# Patient Record
Sex: FEMALE | Race: WHITE | URBAN - METROPOLITAN AREA
[De-identification: names, ages, dates, MRNs, and addresses within clinical notes are randomized per-mention and may not be internally consistent; named-entity substitution may affect disease eponyms.]

---

## 2017-02-09 ENCOUNTER — TRANSFERRED RECORDS (OUTPATIENT)
Dept: HEALTH INFORMATION MANAGEMENT | Facility: CLINIC | Age: 37
End: 2017-02-09

## 2017-02-09 ENCOUNTER — HOSPITAL ENCOUNTER (OUTPATIENT)
Facility: CLINIC | Age: 37
End: 2017-02-09
Payer: MEDICAID

## 2017-02-16 RX ORDER — LIDOCAINE 40 MG/G
CREAM TOPICAL
Status: CANCELLED | OUTPATIENT
Start: 2017-02-16

## 2017-02-16 RX ORDER — SODIUM CHLORIDE 9 MG/ML
INJECTION, SOLUTION INTRAVENOUS CONTINUOUS
Status: CANCELLED | OUTPATIENT
Start: 2017-02-16

## 2017-02-17 ENCOUNTER — TELEPHONE (OUTPATIENT)
Dept: INTERVENTIONAL RADIOLOGY/VASCULAR | Facility: CLINIC | Age: 37
End: 2017-02-17

## 2017-02-20 ENCOUNTER — APPOINTMENT (OUTPATIENT)
Dept: MEDSURG UNIT | Facility: CLINIC | Age: 37
End: 2017-02-20
Payer: MEDICAID

## 2017-04-07 ENCOUNTER — TELEPHONE (OUTPATIENT)
Dept: INTERVENTIONAL RADIOLOGY/VASCULAR | Facility: CLINIC | Age: 37
End: 2017-04-07

## 2017-04-07 ENCOUNTER — TRANSFERRED RECORDS (OUTPATIENT)
Dept: HEALTH INFORMATION MANAGEMENT | Facility: CLINIC | Age: 37
End: 2017-04-07

## 2017-04-07 ENCOUNTER — HOSPITAL ENCOUNTER (OUTPATIENT)
Facility: CLINIC | Age: 37
End: 2017-04-07
Payer: MEDICAID

## 2017-04-07 NOTE — PROGRESS NOTES
"Called [facility] @ [phone] and asked them to amend their Lumbar MRI order to be done with General Anesthesia rather than IV Conscious Sedation due to Dr. Ventura Sandoval's comments in his Feb 9, 2017 encounter faxed to us 2/17/17:    \"I think her episodes of hypoxia may be related to her reliance on sedating medications for her chronic pain and spasms\" and \"I wonder if she isn't aspirating - she sleeps in a recliner w/her head/torso elevated generally 30 degrees.\"   She requires a large MRI, weight is 425 pounds. (Charted weights in same note up to 438)    Because of the above statements IR RN will not be able to safely sedate patient lying flat in MRI #4 and the MRI will need to be done with general anesthesia.    I gave the [facility] RN for Dr. Sandoval the Procedure Scheduling Fax #: [phone]    MRI can still accomodate the pt Wed 4/12/17 at 1230, 1330 or 1530 if anesthesia team is available; MRI tech Narendra moved her to the correct room and saved the 1230 spot for now.  "

## 2017-04-11 ENCOUNTER — HOSPITAL ENCOUNTER (OUTPATIENT)
Facility: CLINIC | Age: 37
End: 2017-04-11
Payer: MEDICAID

## 2017-04-12 ENCOUNTER — ANESTHESIA (OUTPATIENT)
Dept: SURGERY | Facility: CLINIC | Age: 37
End: 2017-04-12
Payer: MEDICAID

## 2017-04-12 ENCOUNTER — ANESTHESIA EVENT (OUTPATIENT)
Dept: SURGERY | Facility: CLINIC | Age: 37
End: 2017-04-12
Payer: MEDICAID

## 2017-04-12 DIAGNOSIS — M54.16 RIGHT LUMBAR RADICULOPATHY: Primary | ICD-10-CM

## 2017-04-21 RX ORDER — PSEUDOEPHEDRINE HCL 30 MG
TABLET ORAL EVERY 6 HOURS PRN
COMMUNITY

## 2017-04-21 RX ORDER — AMOXICILLIN 250 MG
1 CAPSULE ORAL DAILY PRN
COMMUNITY

## 2017-04-21 RX ORDER — CETIRIZINE HYDROCHLORIDE 10 MG/1
10 TABLET ORAL DAILY PRN
COMMUNITY

## 2017-04-21 RX ORDER — ONDANSETRON 4 MG/1
4 TABLET, ORALLY DISINTEGRATING ORAL EVERY 8 HOURS PRN
COMMUNITY

## 2017-04-21 RX ORDER — MUPIROCIN 20 MG/G
OINTMENT TOPICAL 2 TIMES DAILY
COMMUNITY

## 2017-04-21 RX ORDER — BUTALBITAL, ACETAMINOPHEN AND CAFFEINE 50; 325; 40 MG/1; MG/1; MG/1
1 TABLET ORAL EVERY 6 HOURS PRN
COMMUNITY

## 2017-04-21 RX ORDER — MULTIPLE VITAMINS W/ MINERALS TAB 9MG-400MCG
1 TAB ORAL DAILY
COMMUNITY

## 2017-04-21 RX ORDER — TRIAMCINOLONE ACETONIDE 0.25 MG/G
OINTMENT TOPICAL 2 TIMES DAILY
COMMUNITY

## 2017-04-25 ENCOUNTER — HOSPITAL ENCOUNTER (OUTPATIENT)
Facility: CLINIC | Age: 37
Discharge: HOME | End: 2017-04-25
Attending: ANESTHESIOLOGY | Admitting: ANESTHESIOLOGY
Payer: MEDICAID

## 2017-04-25 ENCOUNTER — SURGERY (OUTPATIENT)
Age: 37
End: 2017-04-25

## 2017-04-25 ENCOUNTER — HOSPITAL ENCOUNTER (OUTPATIENT)
Dept: MRI IMAGING | Facility: CLINIC | Age: 37
End: 2017-04-25
Attending: INTERNAL MEDICINE | Admitting: ANESTHESIOLOGY
Payer: MEDICAID

## 2017-04-25 VITALS
RESPIRATION RATE: 17 BRPM | OXYGEN SATURATION: 97 % | WEIGHT: 293 LBS | DIASTOLIC BLOOD PRESSURE: 75 MMHG | BODY MASS INDEX: 43.4 KG/M2 | TEMPERATURE: 98.2 F | HEIGHT: 69 IN | SYSTOLIC BLOOD PRESSURE: 141 MMHG

## 2017-04-25 DIAGNOSIS — G89.29 CHRONIC RIGHT-SIDED LOW BACK PAIN, WITH SCIATICA PRESENCE UNSPECIFIED: ICD-10-CM

## 2017-04-25 DIAGNOSIS — M54.5 CHRONIC RIGHT-SIDED LOW BACK PAIN, WITH SCIATICA PRESENCE UNSPECIFIED: ICD-10-CM

## 2017-04-25 PROCEDURE — C9399 UNCLASSIFIED DRUGS OR BIOLOG: HCPCS

## 2017-04-25 PROCEDURE — 71000017 ZZH RECOVERY PHASE 1 LEVEL 3 EA ADDTL HR

## 2017-04-25 PROCEDURE — 71000016 ZZH RECOVERY PHASE 1 LEVEL 3 FIRST HR

## 2017-04-25 PROCEDURE — 25000132 ZZH RX MED GY IP 250 OP 250 PS 637: Performed by: ANESTHESIOLOGY

## 2017-04-25 PROCEDURE — 71000027 ZZH RECOVERY PHASE 2 EACH 15 MINS

## 2017-04-25 PROCEDURE — 40000170 ZZH STATISTIC PRE-PROCEDURE ASSESSMENT II

## 2017-04-25 PROCEDURE — 25000125 ZZHC RX 250

## 2017-04-25 PROCEDURE — 25800025 ZZH RX 258: Performed by: ANESTHESIOLOGY

## 2017-04-25 PROCEDURE — 25000128 H RX IP 250 OP 636

## 2017-04-25 PROCEDURE — 72148 MRI LUMBAR SPINE W/O DYE: CPT

## 2017-04-25 PROCEDURE — 37000009 ZZH ANESTHESIA TECHNICAL FEE, EACH ADDTL 15 MIN

## 2017-04-25 PROCEDURE — 25000128 H RX IP 250 OP 636: Performed by: ANESTHESIOLOGY

## 2017-04-25 PROCEDURE — 25000125 ZZHC RX 250: Performed by: ANESTHESIOLOGY

## 2017-04-25 PROCEDURE — 37000008 ZZH ANESTHESIA TECHNICAL FEE, 1ST 30 MIN

## 2017-04-25 RX ORDER — FENTANYL CITRATE 50 UG/ML
25-50 INJECTION, SOLUTION INTRAMUSCULAR; INTRAVENOUS
Status: DISCONTINUED | OUTPATIENT
Start: 2017-04-25 | End: 2017-04-25 | Stop reason: HOSPADM

## 2017-04-25 RX ORDER — LIDOCAINE 40 MG/G
CREAM TOPICAL
Status: DISCONTINUED | OUTPATIENT
Start: 2017-04-25 | End: 2017-04-25 | Stop reason: HOSPADM

## 2017-04-25 RX ORDER — OXYCODONE HYDROCHLORIDE 5 MG/1
15 TABLET ORAL ONCE
Status: COMPLETED | OUTPATIENT
Start: 2017-04-25 | End: 2017-04-25

## 2017-04-25 RX ORDER — ONDANSETRON 4 MG/1
4 TABLET, ORALLY DISINTEGRATING ORAL EVERY 30 MIN PRN
Status: DISCONTINUED | OUTPATIENT
Start: 2017-04-25 | End: 2017-04-25 | Stop reason: HOSPADM

## 2017-04-25 RX ORDER — MEPERIDINE HYDROCHLORIDE 25 MG/ML
12.5 INJECTION INTRAMUSCULAR; INTRAVENOUS; SUBCUTANEOUS
Status: DISCONTINUED | OUTPATIENT
Start: 2017-04-25 | End: 2017-04-25 | Stop reason: HOSPADM

## 2017-04-25 RX ORDER — SODIUM CHLORIDE, SODIUM LACTATE, POTASSIUM CHLORIDE, CALCIUM CHLORIDE 600; 310; 30; 20 MG/100ML; MG/100ML; MG/100ML; MG/100ML
INJECTION, SOLUTION INTRAVENOUS CONTINUOUS
Status: DISCONTINUED | OUTPATIENT
Start: 2017-04-25 | End: 2017-04-25 | Stop reason: HOSPADM

## 2017-04-25 RX ORDER — NALOXONE HYDROCHLORIDE 0.4 MG/ML
.1-.4 INJECTION, SOLUTION INTRAMUSCULAR; INTRAVENOUS; SUBCUTANEOUS
Status: DISCONTINUED | OUTPATIENT
Start: 2017-04-25 | End: 2017-04-25 | Stop reason: HOSPADM

## 2017-04-25 RX ORDER — ONDANSETRON 2 MG/ML
4 INJECTION INTRAMUSCULAR; INTRAVENOUS EVERY 30 MIN PRN
Status: DISCONTINUED | OUTPATIENT
Start: 2017-04-25 | End: 2017-04-25 | Stop reason: HOSPADM

## 2017-04-25 RX ADMIN — OXYCODONE HYDROCHLORIDE 15 MG: 5 TABLET ORAL at 17:58

## 2017-04-25 RX ADMIN — FENTANYL CITRATE 25 MCG: 50 INJECTION INTRAMUSCULAR; INTRAVENOUS at 15:03

## 2017-04-25 RX ADMIN — FENTANYL CITRATE 50 MCG: 50 INJECTION INTRAMUSCULAR; INTRAVENOUS at 16:20

## 2017-04-25 RX ADMIN — FENTANYL CITRATE 25 MCG: 50 INJECTION INTRAMUSCULAR; INTRAVENOUS at 14:56

## 2017-04-25 RX ADMIN — FENTANYL CITRATE 25 MCG: 50 INJECTION INTRAMUSCULAR; INTRAVENOUS at 15:50

## 2017-04-25 RX ADMIN — ONDANSETRON 4 MG: 2 INJECTION INTRAMUSCULAR; INTRAVENOUS at 15:11

## 2017-04-25 RX ADMIN — FENTANYL CITRATE 25 MCG: 50 INJECTION INTRAMUSCULAR; INTRAVENOUS at 16:02

## 2017-04-25 RX ADMIN — SODIUM CHLORIDE, POTASSIUM CHLORIDE, SODIUM LACTATE AND CALCIUM CHLORIDE: 600; 310; 30; 20 INJECTION, SOLUTION INTRAVENOUS at 13:35

## 2017-04-25 RX ADMIN — FENTANYL CITRATE 25 MCG: 50 INJECTION INTRAMUSCULAR; INTRAVENOUS at 15:27

## 2017-04-25 RX ADMIN — FENTANYL CITRATE 25 MCG: 50 INJECTION INTRAMUSCULAR; INTRAVENOUS at 15:12

## 2017-04-25 ASSESSMENT — PAIN DESCRIPTION - DESCRIPTORS
DESCRIPTORS: CONSTANT
DESCRIPTORS: CONSTANT

## 2017-04-25 NOTE — OR NURSING
"Pt [facility] the  [facility] with her 14 year old daughter. Pt was told by a  Nurse that this was an acceptable plan for patient to safely go home. Unfortunately the patient needs to go home with a responsible adult. [employer] called and they are just a taxi service. Pt's  Duane called and asked if anyone was available to come and get her. Duane stated there wasn't. Duane was offered to have [employer] pick him up and bring him to the  to be with the patient on the ride home. Duane then asked, \"what about an ambulance?\" Duane was informed that we had [facility] as medical transport but they would be responsible for the total cost of the transport. Duane stated, \"That will be fine. It will just be a co-pay for us. We will pay for it\". I replied to Duane, \"I don't know if insurance will cover this...\" Duane stated, \"It will be fine\". Plan is to find out if [facility] can transport to [place].   "

## 2017-04-25 NOTE — DISCHARGE INSTRUCTIONS
[facility]  Same-Day Surgery   Adult Discharge Orders & Instructions     For 24 hours after surgery    1. Get plenty of rest.  A responsible adult must stay with you for at least 24 hours after you leave the hospital.   2. Do not drive or use heavy equipment.  If you have weakness or tingling, don't drive or use heavy equipment until this feeling goes away.  3. Do not drink alcohol.  4. Avoid strenuous or risky activities.  Ask for help when climbing stairs.   5. You may feel lightheaded.  IF so, sit for a few minutes before standing.  Have someone help you get up.   6. If you have nausea (feel sick to your stomach): Drink only clear liquids such as apple juice, ginger ale, broth or 7-Up.  Rest may also help.  Be sure to drink enough fluids.  Move to a regular diet as you feel able.  7. You may have a slight fever. Call the doctor if your fever is over 100 F (37.7 C) (taken under the tongue) or lasts longer than 24 hours.  8. You may have a dry mouth, a sore throat, muscle aches or trouble sleeping.  These should go away after 24 hours.  9. Do not make important or legal decisions.   Call your doctor for any of the followin.  Signs of infection (fever, growing tenderness at the surgery site, a large amount of drainage or bleeding, severe pain, foul-smelling drainage, redness, swelling).    2. It has been over 8 to 10 hours since surgery and you are still not able to urinate (pass water).    3.  Headache for over 24 hours.    To contact a doctor, call [phone] and ask for the resident on call for ANESTHESIA (answered 24 hours a day)      Emergency Department:    [facility]: [phone]       (TTY for hearing impaired: [phone])

## 2017-04-25 NOTE — IP AVS SNAPSHOT
Same Day Surgery [address] [address]    Phone:  [phone]                                       After Visit Summary   4/25/2017    Margoth Liu    MRN: [id number]           After Visit Summary Signature Page     I have received my discharge instructions, and my questions have been answered. I have discussed any challenges I see with this plan with the nurse or doctor.    ..........................................................................................................................................  Patient/Patient Representative Signature      ..........................................................................................................................................  Patient Representative Print Name and Relationship to Patient    ..................................................               ................................................  Date                                            Time    ..........................................................................................................................................  Reviewed by Signature/Title    ...................................................              ..............................................  Date                                                            Time

## 2017-04-25 NOTE — ANESTHESIA PREPROCEDURE EVALUATION
"  Anesthesia Evaluation     . Pt has had prior anesthetic.     No history of anesthetic complications          ROS/MED HX    ENT/Pulmonary:     (+)NICOLE risk factors obese, observed stopped breathing , . .    Neurologic: Comment: R lumbar radiculopathy      Cardiovascular:     (+) ----. : . . BREWSTER, . :. . Previous cardiac testing Echodate:4/2017results:EF 60%. Moderate RV enlargement, moderate LA enlargement.date: results: date: results: date: results:          METS/Exercise Tolerance: Comment: Patient able to walk a mile without shortness of breath.  Patient used to swim frequently during summer. 4 - Raking leaves, gardening   Hematologic: Comments: On coumadin for PE for over a year. Stopped 5 days ago.    (+) History of blood clots pt is anticoagulated, -      Musculoskeletal:         GI/Hepatic:         Renal/Genitourinary:         Endo: Comment: Patient received a weeklong prednisone treatment a month ago for \"sinus infection\".    (+) Chronic steroid usage for Obesity, .      Psychiatric:         Infectious Disease:         Malignancy:         Other: Comment: Restless leg syndrome                    Physical Exam  Normal systems: pulmonary and dental    Airway   Mallampati: III  TM distance: >3 FB  Neck ROM: full    Dental     Cardiovascular   Rhythm and rate: regular and normal      Pulmonary    breath sounds clear to auscultation        ANESTHESIA PREOP EVALUATION    HPI: Margoth Liu is a 37 year old female who presents for Procedure(s):  Anesthesia Offsite Coverage MRI Lumbar Spine Without @1230    No interval changes.     No personal or family h/o anesthesia problems    PMHx/PSHx/ROS:  Past Medical History:   Diagnosis Date     Coagulation disorder (H)      Depression      Dyspnea on exertion      Migraine      Morbid obesity (H)      Obese      Other chronic pain     Back and down right leg     Seasonal allergies      Thrombosis of leg      Walking troubles        Past Surgical History:   Procedure " Laterality Date     C/SECTION, CLASSICAL      2x     DILATION AND CURETTAGE       SALPINGO OOPHORECTOMY,R/L/JOHAN Right      TONSILLECTOMY & ADENOIDECTOMY         Soc Hx:   Social History   Substance Use Topics     Smoking status: Former Smoker     Smokeless tobacco: Not on file     Alcohol use No       Allergies:   Allergies   Allergen Reactions     Droperidol Hives     Lovenox [Enoxaparin] Hives     Solu-Medrol [Methylprednisolone] Hives     Sulfa Drugs Hives     Sumatriptan Hives       Meds:     No current facility-administered medications on file prior to encounter.   No current outpatient prescriptions on file prior to encounter.    Labs:    Blood Bank:  No results found for: ABO, RH, AS  BMP:  No results for input(s): NA, POTASSIUM, CHLORIDE, CO2, BUN, CR, GLC, STEPHON in the last 32019 hours.  CBC:   No results for input(s): WBC, RBC, HGB, HCT, MCV, MCH, MCHC, RDW, PLT in the last 49489 hours.  Coags:  No results for input(s): INR, PTT, FIBR in the last 78784 hours.    No results found for this or any previous visit.  No results found for this or any previous visit (from the past 8760 hour(s)).                  Anesthesia Plan      History & Physical Review  History and physical reviewed and following examination; no interval change.    ASA Status:  3 .    NPO Status:  > 8 hours    Plan for General, ETT and RSI with Intravenous induction. Maintenance will be Balanced.    PONV prophylaxis:  Ondansetron (or other 5HT-3) and Other (See comment)  Additional equipment: Videolaryngoscope      Postoperative Care  Postoperative pain management:  IV analgesics and Multi-modal analgesia.      Consents  Anesthetic plan, risks, benefits and alternatives discussed with:  Patient..                          .

## 2017-04-25 NOTE — IP AVS SNAPSHOT
MRN:[id number]                      After Visit Summary   4/25/2017    Margoth Liu    MRN: [id number]           Thank you!     Thank you for choosing [facility] for your care. Our goal is always to provide you with excellent care. Hearing back from our patients is one way we can continue to improve our services. Please take a few minutes to complete the written survey that you may receive in the mail after you visit with us. Thank you!        Patient Information     Date Of Birth          1980        About your hospital stay     You were admitted on:  April 25, 2017 You last received care in the:  Same Day Surgery [facility]    You were discharged on:  April 25, 2017       Who to Call     For medical emergencies, please call 911.  For non-urgent questions about your medical care, please call your primary care provider or clinic, [phone]  For questions related to your surgery, please call your surgery clinic        Attending Provider     Provider Arpan Gaines MD Anesthesiology       Primary Care Provider Office Phone # Fax #    Ventura J Lori [phone]       [facility] [address]  [facility] [address]        Further instructions from your care team       [facility]  Same-Day Surgery   Adult Discharge Orders & Instructions     For 24 hours after surgery    1. Get plenty of rest.  A responsible adult must stay with you for at least 24 hours after you leave the hospital.   2. Do not drive or use heavy equipment.  If you have weakness or tingling, don't drive or use heavy equipment until this feeling goes away.  3. Do not drink alcohol.  4. Avoid strenuous or risky activities.  Ask for help when climbing stairs.   5. You may feel lightheaded.  IF so, sit for a few minutes before standing.  Have someone help you get up.   6. If you have nausea (feel sick to your stomach): Drink only clear  liquids such as apple juice, ginger ale, broth or 7-Up.  Rest may also help.  Be sure to drink enough fluids.  Move to a regular diet as you feel able.  7. You may have a slight fever. Call the doctor if your fever is over 100 F (37.7 C) (taken under the tongue) or lasts longer than 24 hours.  8. You may have a dry mouth, a sore throat, muscle aches or trouble sleeping.  These should go away after 24 hours.  9. Do not make important or legal decisions.   Call your doctor for any of the followin.  Signs of infection (fever, growing tenderness at the surgery site, a large amount of drainage or bleeding, severe pain, foul-smelling drainage, redness, swelling).    2. It has been over 8 to 10 hours since surgery and you are still not able to urinate (pass water).    3.  Headache for over 24 hours.    To contact a doctor, call [phone] and ask for the resident on call for ANESTHESIA (answered 24 hours a day)      Emergency Department:    [facility]: [phone]       (TTY for hearing impaired: [phone])    Warfarin Instruction     You have started taking a medicine called warfarin. This is a blood-thinning medicine (anticoagulant). It helps prevent and treat blood clots.      Before leaving the hospital, make sure you know how much to take and how long to take it.      You will need regular blood tests to make sure your blood is clotting safely. It is very important to see your doctor for regular blood tests.    Talk to your doctor before taking any new medicine (this includes over-the-counter drugs and herbal products). Many medicines can interact with warfarin. This may cause more bleeding or too much clotting.     Eating a lot of vitamin K--found in green, leafy vegetables--can change the way warfarin works in your body. Do NOT avoid these foods. Instead, try to eat the same amount each day.     Bleeding is the most common side-effect of warfarin. You may notice bleeding gums, a bloody nose,  "bruises and bleeding longer when you cut yourself. See a doctor at once if:   o You cough up blood  o You find blood in your stool (poop)  o You have a deep cut, or a cut that bleeds longer than 10 minutes   o You have a bad cut, hard fall, accident or hit your head (go to urgent care or the emergency room).    For women who can get pregnant: This medicine can harm an unborn baby. Be very careful not to get pregnant while taking this medicine. If you think you might be pregnant, call your doctor right away.    For more information, read \"Guide to Warfarin Therapy,  the booklet you received in the hospital.        Pending Results     No orders found from 4/23/2017 to 4/26/2017.            Admission Information     Date & Time Provider Department Dept. Phone    4/25/2017 Arpan Aguirre MD Same Day Surgery [facility] [phone]      Your Vitals Were     Blood Pressure Temperature Respirations Height Weight Last Period    141/75 98.1  F (36.7  C) (Axillary) 16 1.753 m (5' 9\") 200.7 kg (442 lb 7.4 oz) 03/25/2017 (Approximate)    Pulse Oximetry BMI (Body Mass Index)                97% 65.34 kg/m2          [facility] (roll.to) Information     [employer] gives you secure access to your electronic health record. If you see a primary care provider, you can also send messages to your care team and make appointments. If you have questions, please call your primary care clinic.  If you do not have a primary care provider, please call [phone] and they will assist you.        Care EveryWhere ID     This is your Care EveryWhere ID. This could be used by other organizations to access your [facility] medical records  [id number]           Review of your medicines      UNREVIEWED medicines. Ask your doctor about these medicines        Dose / Directions    ALBUTEROL IN        Dose:  2 puff   Inhale 2 puffs into the lungs 4 times daily as needed   Refills:  0       butalbital-acetaminophen-caffeine -40 MG per tablet   Commonly " known as:  FIORICET/ESGIC        Dose:  1 tablet   Take 1 tablet by mouth every 6 hours as needed   Refills:  0       cetirizine 10 MG tablet   Commonly known as:  zyrTEC        Dose:  10 mg   Take 10 mg by mouth daily as needed for allergies   Refills:  0       DIAZEPAM PO   Indication:  Muscle Spasm        Dose:  5 mg   Take 5 mg by mouth every 8 hours as needed for anxiety 1 or 2 tablets   Refills:  0       GABAPENTIN PO        Dose:  300 mg   Take 300 mg by mouth 3 times daily 1 to 2 tablets   Refills:  0       IBUPROFEN PO        Dose:  800 mg   Take 800 mg by mouth every 8 hours as needed for moderate pain   Refills:  0       MAPAP ARTHRITIS PAIN PO        Dose:  650 mg   Take 650 mg by mouth 4 times daily as needed for mild pain or fever   Refills:  0       Multi-vitamin Tabs tablet        Dose:  1 tablet   Take 1 tablet by mouth daily   Refills:  0       mupirocin 2 % ointment   Commonly known as:  BACTROBAN        Apply topically 2 times daily   Refills:  0       ondansetron 4 MG ODT tab   Commonly known as:  ZOFRAN-ODT        Dose:  4 mg   Take 4 mg by mouth every 8 hours as needed for nausea 1 or 2 tablets   Refills:  0       OXYCODONE HCL PO        Dose:  5 mg   Take 5 mg by mouth 4 times daily 3 tablets (total 15 mg)   Refills:  0       senna-docusate 8.6-50 MG per tablet   Commonly known as:  SENOKOT-S;PERICOLACE        Dose:  1 tablet   Take 1 tablet by mouth daily as needed for constipation   Refills:  0       SUDOGEST 30 MG tablet   Generic drug:  pseudoePHEDrine        Take by mouth every 6 hours as needed for congestion   Refills:  0       triamcinolone 0.025 % ointment   Commonly known as:  KENALOG        Apply topically 2 times daily   Refills:  0       WARFARIN SODIUM PO        Dose:  20 mg   Take 20 mg by mouth   Refills:  0                Protect others around you: Learn how to safely use, store and throw away your medicines at www.disposemymeds.org.             Medication List: This is a  list of all your medications and when to take them. Check marks below indicate your daily home schedule. Keep this list as a reference.      Medications           Morning Afternoon Evening Bedtime As Needed    ALBUTEROL IN   Inhale 2 puffs into the lungs 4 times daily as needed                                butalbital-acetaminophen-caffeine -40 MG per tablet   Commonly known as:  FIORICET/ESGIC   Take 1 tablet by mouth every 6 hours as needed                                cetirizine 10 MG tablet   Commonly known as:  zyrTEC   Take 10 mg by mouth daily as needed for allergies                                DIAZEPAM PO   Take 5 mg by mouth every 8 hours as needed for anxiety 1 or 2 tablets                                GABAPENTIN PO   Take 300 mg by mouth 3 times daily 1 to 2 tablets                                IBUPROFEN PO   Take 800 mg by mouth every 8 hours as needed for moderate pain                                MAPAP ARTHRITIS PAIN PO   Take 650 mg by mouth 4 times daily as needed for mild pain or fever                                Multi-vitamin Tabs tablet   Take 1 tablet by mouth daily                                mupirocin 2 % ointment   Commonly known as:  BACTROBAN   Apply topically 2 times daily                                ondansetron 4 MG ODT tab   Commonly known as:  ZOFRAN-ODT   Take 4 mg by mouth every 8 hours as needed for nausea 1 or 2 tablets                                OXYCODONE HCL PO   Take 5 mg by mouth 4 times daily 3 tablets (total 15 mg)   Last time this was given:  15 mg on 4/25/2017  5:58 PM                                senna-docusate 8.6-50 MG per tablet   Commonly known as:  SENOKOT-S;PERICOLACE   Take 1 tablet by mouth daily as needed for constipation                                SUDOGEST 30 MG tablet   Take by mouth every 6 hours as needed for congestion   Generic drug:  pseudoePHEDrine                                triamcinolone 0.025 % ointment   Commonly  known as:  KENALOG   Apply topically 2 times daily                                WARFARIN SODIUM PO   Take 20 mg by mouth

## 2017-04-25 NOTE — OR NURSING
"Pt's ride is set up with [employer]. Notified Duane- that ride will arrive to pick pt up at 7:45 pm. Notified Duane, \"Since this transport isn't medically necessary it may not be covered by insurance.\" Duane states, \"Okay\". Plan remains for pt to take [employer] transport home with daughter.   "

## 2017-04-25 NOTE — OR NURSING
Pt planned on using [employer] to go home. [employer] called and they are just a taxi service. Pt only has her 14 year old daughter here with her. Pt does not have another method home. Pt states  can not come get her as their car [place]'t be able [place] it to the cities. Social work notified.

## 2017-04-25 NOTE — ANESTHESIA POSTPROCEDURE EVALUATION
Patient: Margoth Liu    Procedure(s):  Anesthesia Offsite Coverage MRI Lumbar Spine Without @1230    Diagnosis:Chronic Right Lower Back Pain   Diagnosis Additional Information: No value filed.    Anesthesia Type:  No value filed.    Note:  Anesthesia Post Evaluation    Patient location during evaluation: PACU  Patient participation: Able to fully participate in evaluation  Level of consciousness: awake  Pain management: adequate  Airway patency: patent  Cardiovascular status: acceptable  Respiratory status: acceptable  Hydration status: acceptable  PONV: none     Anesthetic complications: None    Comments: Patient extubated in pacu. VSS. No complaints or complications.         Last vitals:  Vitals:    04/25/17 1215   BP: 145/89   Resp: 18   Temp: 36.7  C (98.1  F)   SpO2: 98%         Electronically Signed By: Lj Lo MD  April 25, 2017  2:52 PM

## 2017-04-25 NOTE — ANESTHESIA CARE TRANSFER NOTE
Patient: Margoth Liu    Procedure(s):  Anesthesia Offsite Coverage MRI Lumbar Spine Without @1230    Diagnosis: Chronic Right Lower Back Pain   Diagnosis Additional Information: No value filed.    Anesthesia Type:   General, ETT, RSI     Note:  Airway :Face Mask  Patient transferred to:PACU  Comments: Spont resp, VSS, report to RN      Vitals: (Last set prior to Anesthesia Care Transfer)              Electronically Signed By: DEANA Martínez CRNA  April 25, 2017  3:03 PM

## 2017-04-25 NOTE — OR NURSING
"Dr. Skaggs paged to clarify \"oral pain medication\" order in epic - pt okay to take home dose of 15mg oxycodone PO.  "

## 2017-04-26 NOTE — OR NURSING
Dr. Sharifa ramos with writer typing and printing a school excuse note for pt's daughter.  Note was printed and pt's identifiers were cut off and shredded, and then note was given to daughter.

## 2017-04-26 NOTE — OR NURSING
[facility]  Pre/Post Care Unit 3C        Margoth Liu  [address]  [facility] [address]      Date: 4/25/2017      TO WHOM IT MAY CONCERN:    Margoth Liu was seen at our hospital for a procedure on 4/25/2017 and needed her daughter Tomasa Liu to accompany her.  Patient's daughter may return to school or work 4/26/17.    Sincerely,      Sandra Doan RN  4/25/2017  7:23 PM       Pre/Post Care Unit  [address] [address]

## 2018-01-07 ENCOUNTER — HEALTH MAINTENANCE LETTER (OUTPATIENT)
Age: 38
End: 2018-01-07

## 2020-03-10 ENCOUNTER — HEALTH MAINTENANCE LETTER (OUTPATIENT)
Age: 40
End: 2020-03-10

## 2020-12-27 ENCOUNTER — HEALTH MAINTENANCE LETTER (OUTPATIENT)
Age: 40
End: 2020-12-27

## 2021-04-24 ENCOUNTER — HEALTH MAINTENANCE LETTER (OUTPATIENT)
Age: 41
End: 2021-04-24

## 2021-10-09 ENCOUNTER — HEALTH MAINTENANCE LETTER (OUTPATIENT)
Age: 41
End: 2021-10-09

## 2022-05-16 ENCOUNTER — HEALTH MAINTENANCE LETTER (OUTPATIENT)
Age: 42
End: 2022-05-16

## 2022-09-11 ENCOUNTER — HEALTH MAINTENANCE LETTER (OUTPATIENT)
Age: 42
End: 2022-09-11

## 2023-06-03 ENCOUNTER — HEALTH MAINTENANCE LETTER (OUTPATIENT)
Age: 43
End: 2023-06-03

## 2024-02-24 ENCOUNTER — HEALTH MAINTENANCE LETTER (OUTPATIENT)
Age: 44
End: 2024-02-24

## 2025-02-03 NOTE — OR NURSING
Pharmacy called and notified ok to dispense.    Pt home with [facility] transport, accompanied by daughter Tomasa.

## (undated) RX ORDER — FENTANYL CITRATE 50 UG/ML
INJECTION, SOLUTION INTRAMUSCULAR; INTRAVENOUS
Status: DISPENSED
Start: 2017-04-25

## (undated) RX ORDER — OXYCODONE HYDROCHLORIDE 5 MG/1
TABLET ORAL
Status: DISPENSED
Start: 2017-04-25

## (undated) RX ORDER — ONDANSETRON 2 MG/ML
INJECTION INTRAMUSCULAR; INTRAVENOUS
Status: DISPENSED
Start: 2017-04-25